# Patient Record
Sex: FEMALE | Race: WHITE
[De-identification: names, ages, dates, MRNs, and addresses within clinical notes are randomized per-mention and may not be internally consistent; named-entity substitution may affect disease eponyms.]

---

## 2018-11-20 ENCOUNTER — APPOINTMENT (OUTPATIENT)
Dept: ULTRASOUND IMAGING | Facility: HOSPITAL | Age: 58
End: 2018-11-20

## 2018-11-20 ENCOUNTER — OUTPATIENT (OUTPATIENT)
Dept: OUTPATIENT SERVICES | Facility: HOSPITAL | Age: 58
LOS: 1 days | End: 2018-11-20
Payer: COMMERCIAL

## 2018-11-20 PROBLEM — Z00.00 ENCOUNTER FOR PREVENTIVE HEALTH EXAMINATION: Status: ACTIVE | Noted: 2018-11-20

## 2018-11-20 PROCEDURE — 76882 US LMTD JT/FCL EVL NVASC XTR: CPT

## 2018-11-20 PROCEDURE — 76882 US LMTD JT/FCL EVL NVASC XTR: CPT | Mod: 26,RT

## 2021-05-07 ENCOUNTER — APPOINTMENT (OUTPATIENT)
Dept: VASCULAR SURGERY | Facility: CLINIC | Age: 61
End: 2021-05-07
Payer: COMMERCIAL

## 2021-05-07 DIAGNOSIS — G54.0 BRACHIAL PLEXUS DISORDERS: ICD-10-CM

## 2021-05-07 PROCEDURE — 93930 UPPER EXTREMITY STUDY: CPT

## 2021-05-07 PROCEDURE — 99072 ADDL SUPL MATRL&STAF TM PHE: CPT

## 2021-05-07 PROCEDURE — 99243 OFF/OP CNSLTJ NEW/EST LOW 30: CPT

## 2021-05-10 NOTE — PHYSICAL EXAM
[Respiratory Effort] : normal respiratory effort [Normal Rate and Rhythm] : normal rate and rhythm [Alert] : alert [Oriented to Person] : oriented to person [Oriented to Place] : oriented to place [Oriented to Time] : oriented to time [Calm] : calm [2+] : left 2+ [Ankle Swelling (On Exam)] : not present [Varicose Veins Of Lower Extremities] : not present [] : not present [Abdomen Tenderness] : ~T ~M No abdominal tenderness [de-identified] : Well appearing  [de-identified] : NC/AT  [de-identified] : Supple  [FreeTextEntry1] : No b/l upper extremity swelling appreciated, FROM.  [de-identified] : FROM

## 2021-05-10 NOTE — HISTORY OF PRESENT ILLNESS
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 Patient referred by Dr. Tristen Sandoval. She mentions a long standing hx of spinal fusion from T3-S1 by  in 2011 s/p revision in 2016 and later in 2020. Patient states since then she has been experiencing LUE discomfort with numbness and tingling sensation particularly over the L thumb. Occasionally she has difficulty holding surgical instruments and has become concern this may continue to progress. She notes her b/l upper extremities become numb at times and is concern this may be due to blood clot on her arm. Denies: LUE Pain, cyanosis, b/l UE swelling or LUE heaviness

## 2021-05-10 NOTE — ASSESSMENT
[Arterial/Venous Disease] : arterial/venous disease [FreeTextEntry1] : 61 y/o F with chronic back pain presents to the office for initial evaluation of b/l upper extremity numbness and tingling sensation L>R. On exam, no b/l upper extremity swelling appreciated, FROM. \par \par Plan: \par Discussed findings and treatment options. I reviewed b/l arterial US with no evidence of subclavian artery stenosis bilaterally. No flow changes with arm maneuvers. \par I explained to the patient this symptoms are not vascular in origin and are likely secondary to radiculopathy pain\par To f/u with Dr. Sandoval for back issues. \par No vascular surgery intervention required at this moment. She will continue to follow up with us here PRN.

## 2021-05-10 NOTE — PROCEDURE
[FreeTextEntry1] : BLUE arterial US: Patent SCA bilaterally. No significant flow changes with arm in abduction.

## 2021-05-10 NOTE — ADDENDUM
[FreeTextEntry1] : This note was written by Maria Luisa Goldberg on 05/07/2021 acting as scribe for Arnulfo Maza M.D.\par \par I, Arnulfo Coronado have read and attest that all the information, medical decision making and discharge instructions within are true and accurate.

## 2021-11-19 ENCOUNTER — TRANSCRIPTION ENCOUNTER (OUTPATIENT)
Age: 61
End: 2021-11-19

## 2021-11-29 ENCOUNTER — TRANSCRIPTION ENCOUNTER (OUTPATIENT)
Age: 61
End: 2021-11-29

## 2022-10-20 ENCOUNTER — APPOINTMENT (OUTPATIENT)
Dept: NEUROSURGERY | Facility: CLINIC | Age: 62
End: 2022-10-20